# Patient Record
Sex: FEMALE | Race: BLACK OR AFRICAN AMERICAN | NOT HISPANIC OR LATINO | Employment: OTHER | URBAN - METROPOLITAN AREA
[De-identification: names, ages, dates, MRNs, and addresses within clinical notes are randomized per-mention and may not be internally consistent; named-entity substitution may affect disease eponyms.]

---

## 2024-11-17 ENCOUNTER — HOSPITAL ENCOUNTER (EMERGENCY)
Facility: HOSPITAL | Age: 89
Discharge: HOME | End: 2024-11-18
Attending: EMERGENCY MEDICINE | Admitting: EMERGENCY MEDICINE
Payer: MEDICARE

## 2024-11-17 DIAGNOSIS — L03.90 CELLULITIS: ICD-10-CM

## 2024-11-17 DIAGNOSIS — T50.901A ACCIDENTAL OVERDOSE: Primary | ICD-10-CM

## 2024-11-17 DIAGNOSIS — T44.7X1A: ICD-10-CM

## 2024-11-17 PROCEDURE — 99284 EMERGENCY DEPT VISIT MOD MDM: CPT

## 2024-11-18 VITALS
HEIGHT: 62 IN | RESPIRATION RATE: 15 BRPM | BODY MASS INDEX: 22.72 KG/M2 | DIASTOLIC BLOOD PRESSURE: 58 MMHG | SYSTOLIC BLOOD PRESSURE: 116 MMHG | OXYGEN SATURATION: 96 % | WEIGHT: 123.46 LBS | HEART RATE: 61 BPM | TEMPERATURE: 97.9 F

## 2024-11-18 LAB
ATRIAL RATE: 55 BPM
P AXIS: 64 DEGREES
PR INTERVAL: 206 MS
QRS AXIS: 5 DEGREES
QRSD INTERVAL: 78 MS
QT INTERVAL: 442 MS
QTC INTERVAL: 422 MS
T WAVE AXIS: 55 DEGREES
VENTRICULAR RATE: 55 BPM

## 2024-11-18 PROCEDURE — 93010 ELECTROCARDIOGRAM REPORT: CPT | Performed by: STUDENT IN AN ORGANIZED HEALTH CARE EDUCATION/TRAINING PROGRAM

## 2024-11-18 PROCEDURE — 99284 EMERGENCY DEPT VISIT MOD MDM: CPT | Performed by: EMERGENCY MEDICINE

## 2024-11-18 PROCEDURE — 93005 ELECTROCARDIOGRAM TRACING: CPT

## 2024-11-18 RX ORDER — CEPHALEXIN 500 MG/1
500 CAPSULE ORAL 3 TIMES DAILY
Qty: 21 CAPSULE | Refills: 0 | Status: SHIPPED | OUTPATIENT
Start: 2024-11-18 | End: 2024-11-25

## 2024-11-18 RX ORDER — GINSENG 100 MG
1 CAPSULE ORAL ONCE
Status: COMPLETED | OUTPATIENT
Start: 2024-11-18 | End: 2024-11-18

## 2024-11-18 RX ADMIN — BACITRACIN ZINC 1 LARGE APPLICATION: 500 OINTMENT TOPICAL at 02:46

## 2024-11-18 NOTE — ED PROVIDER NOTES
Time reflects when diagnosis was documented in both MDM as applicable and the Disposition within this note       Time User Action Codes Description Comment    11/18/2024  5:21 AM Tahir Mendenhall Magui [T50.901A] Accidental overdose     11/18/2024  5:23 AM Tahir Mendenhall Magui [T44.7X1A] Overdose of atenolol     11/18/2024  5:24 AM Tahir Mendenhall Magui [L03.90] Cellulitis           ED Disposition       ED Disposition   Discharge    Condition   Stable    Date/Time   Mon Nov 18, 2024  5:21 AM    Comment   Yanci Reddy discharge to home/self care.                   Assessment & Plan       Medical Decision Making  89-year-old female presents to the emergency room after inadvertently taking a second dose of her nighttime medications.  Patient received her initial dose at 2030 hrs. and a second dose around 2230 hrs.    Patient took 5 mg of buspirone, 25 mg of atenolol, and 10 mg of simvastatin.    Patient's family contacted 911 and subsequently poison control who advised him to come to the emergency room as the patient takes additional antihypertensive medications.    Patient states she feels fatigued but otherwise denies any symptoms.  Patient denies any lightheadedness or near syncopal episodes.  Patient's family affirms no history of syncope.  Patient is at her baseline mental status according to the family and does not recall the incident, information obtained from the family.    Patient's family affirms inadvertent use of the medication and this appears consistent with a history.  Patient appears well kept for without signs of abuse and the family acted appropriately upon obtaining the information.    Impression and plan: Accidental overdose of multiple medications.  Patient with mild bradycardia but is asymptomatic regarding this.  Otherwise normal vital signs, no signs of serotonin syndrome.  Patient without arthralgias or myalgias that would be concerning for rhabdomyolysis.  Will place patient on cardiac monitoring and  reassess.    Risk  OTC drugs.  Prescription drug management.        ED Course as of 11/20/24 0316   Mon Nov 18, 2024   0031 EKG demonstrates sinus bradycardia rate of 55, normal intervals.  No acute ST segment changes.   0255 Discussed with poison control who suggested 6-hour observation which would be 3 additional hours from now.   0614 Patient continues to be asymptomatic.  I discussed medication safety with the patient and family.  Patient family notes an area on her left lower leg that the patient has been scratching.  There is mild erythema and warmth to the area but does not appear overtly cellulitic.  Could represent early cellulitis.  Treated the area with bacitracin as there are several abrasions.  No crepitus, no tenderness.  No swelling that would be concerning for DVT.  Considering patient has poor follow-up as she lives typically in [place], I have provided a prescription for antibiotics for the patient to take if the symptoms persist or worsen.  Discussed the use with the patient and her family.    Discussed avoidance of additional doses of the medication.  Discussed close follow-up and return to the emergency progression or worsening symptoms.       Medications   bacitracin topical ointment 1 large application (1 large application Topical Given 11/18/24 0246)       ED Risk Strat Scores                           SBIRT 20yo+      Flowsheet Row Most Recent Value   Initial Alcohol Screen: US AUDIT-C     1. How often do you have a drink containing alcohol? 0 Filed at: 11/17/2024 2356   2. How many drinks containing alcohol do you have on a typical day you are drinking?  0 Filed at: 11/17/2024 2356   3b. FEMALE Any Age, or MALE 65+: How often do you have 4 or more drinks on one occassion? 0 Filed at: 11/17/2024 2356   Audit-C Score 0 Filed at: 11/17/2024 2356   MEGA: How many times in the past year have you...    Used an illegal drug or used a prescription medication for non-medical reasons? Never Filed at:  11/17/2024 2356                            History of Present Illness       Chief Complaint   Patient presents with    Medical Problem     Pt took her night medications at 2000 and accidentally took a second dose of the same medications.  Buspar  5mg, atenolol 25 mg, simvastatin 10mg. Poison control was called and sent pt to ed because she takes other blood pressure medications during the day.         No past medical history on file.   No past surgical history on file.   No family history on file.       No existing history information found.   No existing history information found.   I have reviewed and agree with the history as documented.     HPI    Review of Systems        Objective       ED Triage Vitals [11/17/24 2358]   Temperature Pulse Blood Pressure Respirations SpO2 Patient Position - Orthostatic VS   97.9 °F (36.6 °C) 55 (!) 197/81 18 99 % Lying      Temp Source Heart Rate Source BP Location FiO2 (%) Pain Score    Oral Monitor Right arm -- --      Vitals      Date and Time Temp Pulse SpO2 Resp BP Pain Score FACES Pain Rating User   11/18/24 0400 -- 61 96 % 15 116/58 -- -- ML   11/18/24 0230 -- 58 97 % 15 140/63 -- -- AR   11/18/24 0200 -- 57 96 % 18 157/72 -- -- AR   11/18/24 0130 -- 54 98 % 19 146/67 -- -- AR   11/18/24 0100 -- 54 98 % 19 164/72 -- -- AR   11/18/24 0045 -- 54 99 % 20 163/72 -- -- AR   11/17/24 2358 97.9 °F (36.6 °C) 55 99 % 18 197/81 -- -- AS            Physical Exam  HENT:      Head: Normocephalic.      Mouth/Throat:      Mouth: Mucous membranes are moist.   Eyes:      Conjunctiva/sclera: Conjunctivae normal.   Cardiovascular:      Rate and Rhythm: Regular rhythm. Bradycardia present.   Pulmonary:      Effort: Pulmonary effort is normal.   Abdominal:      Tenderness: There is no abdominal tenderness. There is no guarding or rebound.   Skin:     General: Skin is warm and dry.   Neurological:      General: No focal deficit present.      Mental Status: She is alert. Mental status is at  baseline.      Comments: No clonus or rigidity.   Psychiatric:         Cognition and Memory: Memory is impaired.         Results Reviewed       None            No orders to display       Procedures    ED Medication and Procedure Management   None     Discharge Medication List as of 11/18/2024  5:25 AM        START taking these medications    Details   cephalexin (KEFLEX) 500 mg capsule Take 1 capsule (500 mg total) by mouth 3 (three) times a day for 7 days, Starting Mon 11/18/2024, Until Mon 11/25/2024, Print           No discharge procedures on file.  ED SEPSIS DOCUMENTATION   Time reflects when diagnosis was documented in both MDM as applicable and the Disposition within this note       Time User Action Codes Description Comment    11/18/2024  5:21 AM Tahir Mendenhall [T50.901A] Accidental overdose     11/18/2024  5:23 AM Tahir Mendenhall [T44.7X1A] Overdose of atenolol     11/18/2024  5:24 AM Tahir Mendenhall [L03.90] Cellulitis                  Tahir Mendenhall MD  11/20/24 0317